# Patient Record
Sex: FEMALE | Race: WHITE | NOT HISPANIC OR LATINO | Employment: FULL TIME
[De-identification: names, ages, dates, MRNs, and addresses within clinical notes are randomized per-mention and may not be internally consistent; named-entity substitution may affect disease eponyms.]

---

## 2017-07-08 ENCOUNTER — HEALTH MAINTENANCE LETTER (OUTPATIENT)
Age: 32
End: 2017-07-08

## 2019-10-04 ENCOUNTER — HEALTH MAINTENANCE LETTER (OUTPATIENT)
Age: 34
End: 2019-10-04

## 2020-02-04 ENCOUNTER — OFFICE VISIT (OUTPATIENT)
Dept: OPHTHALMOLOGY | Facility: CLINIC | Age: 35
End: 2020-02-04
Payer: COMMERCIAL

## 2020-02-04 DIAGNOSIS — H52.03 HYPERMETROPIA OF BOTH EYES: ICD-10-CM

## 2020-02-04 DIAGNOSIS — H40.10X4 APHAKIC OPEN-ANGLE GLAUCOMA OF BOTH EYES, INDETERMINATE STAGE: Primary | ICD-10-CM

## 2020-02-04 DIAGNOSIS — H27.03 APHAKIC OPEN-ANGLE GLAUCOMA OF BOTH EYES, INDETERMINATE STAGE: Primary | ICD-10-CM

## 2020-02-04 RX ORDER — DORZOLAMIDE HYDROCHLORIDE AND TIMOLOL MALEATE 20; 5 MG/ML; MG/ML
1 SOLUTION/ DROPS OPHTHALMIC 2 TIMES DAILY
Qty: 1 BOTTLE | Refills: 11 | Status: SHIPPED | OUTPATIENT
Start: 2020-02-04 | End: 2022-06-02

## 2020-02-04 RX ORDER — LATANOPROST 50 UG/ML
1 SOLUTION/ DROPS OPHTHALMIC AT BEDTIME
Qty: 1 BOTTLE | Refills: 11 | Status: SHIPPED | OUTPATIENT
Start: 2020-02-04 | End: 2022-06-02

## 2020-02-04 ASSESSMENT — SLIT LAMP EXAM - LIDS
COMMENTS: NORMAL
COMMENTS: NORMAL

## 2020-02-04 ASSESSMENT — REFRACTION_MANIFEST
OS_SPHERE: +14.25
OD_CYLINDER: SPHERE
OD_ADD: +4.00
OD_SPHERE: +13.75
OS_CYLINDER: SPHERE
OS_ADD: +4.00

## 2020-02-04 ASSESSMENT — VISUAL ACUITY
OS_CC+: -1
OD_CC: 20/70
OS_CC: 20/70
CORRECTION_TYPE: GLASSES
METHOD: SNELLEN - LINEAR
OD_CC+: +1

## 2020-02-04 ASSESSMENT — CONF VISUAL FIELD
OD_NORMAL: 1
OS_NORMAL: 1

## 2020-02-04 ASSESSMENT — TONOMETRY
OS_IOP_MMHG: 13
IOP_METHOD: ICARE
OD_IOP_MMHG: 14

## 2020-02-04 ASSESSMENT — CUP TO DISC RATIO
OD_RATIO: CUPPED
OS_RATIO: 0.4

## 2020-02-04 ASSESSMENT — REFRACTION_WEARINGRX
OD_AXIS: 090
OS_SPHERE: +14.75
OS_ADD: +4.00
OD_SPHERE: +14.25
OD_ADD: +4.00
OS_AXIS: 090
OS_CYLINDER: +0.25
OD_CYLINDER: +0.25

## 2020-02-04 NOTE — NURSING NOTE
Chief Complaints and History of Present Illnesses   Patient presents with     Annual Eye Exam     Chief Complaint(s) and History of Present Illness(es)     Annual Eye Exam     Laterality: both eyes    Onset: gradual    Onset: years ago    Frequency: constantly    Course: stable    Treatments tried: eye drops    Pain scale: 0/10              Comments     Patient states vision has been stable since last eye exam, both eyes. Denies eye pain or irritation.     Using Dorz BID OU, Travatan BID OU    Nuvia Summers COT 11:38 AM February 4, 2020

## 2020-02-04 NOTE — PROGRESS NOTES
History  HPI     Annual Eye Exam     In both eyes.  Onset was gradual.  This started years ago.  Occurring constantly.  Since onset it is stable.  Treatments tried include eye drops.  Pain was noted as 0/10.              Comments     Patient states vision has been stable since last eye exam, both eyes. Denies eye pain or irritation.     Using Dorz BID OU, Travatan BID OU    Nuvia Summers COT 11:38 AM February 4, 2020     Has been going to local eye clinics for pressure checks and to refill eye medications in [place].            Last edited by Lion Alston, OD on 2/4/2020 12:11 PM. (History)          Assessment/Plan  (H40.10X4,  H27.03) Aphakic open-angle glaucoma of both eyes, indeterminate stage  (primary encounter diagnosis)  Comment: Good IOPs today, closed angle noted previously right eye   Plan: dorzolamide-timolol (COSOPT) 2-0.5 % ophthalmic solution, latanoprost (XALATAN) 0.005 % ophthalmic solution         Patient lost to follow up. Pressures good today. Scheduled with Dr. Finley to continue care. Continue Cosopt twice a day both eyes and Xalatan at bedtime both eyes.     (H52.03) Hypermetropia of both eyes  Comment: Simple hyperopia both eyes, aphakic both eyes  Plan: REFRACTION         Dispensed spectacle prescription for full time wear. Recommend lenticular lens design due to high plus. Educated patient on possibility of adaptation period, if symptoms do not improve return to clinic for further testing.     Emily Adams, Optometry Student    I have reviewed and agree with the above plan as written.    Teaching Statement:    Complete documentation of historical and exam elements from today's encounter can  be found in the full encounter summary report (not reduplicated in this progress  note). I personally obtained the chief complaint(s) and history of present illness. I  confirmed and edited as necessary the review of systems, past medical/surgical  history, family history, social history, and  examination findings as documented by  others; and I examined the patient myself. I personally reviewed the relevant tests,  images, and reports as documented above. I formulated and edited as necessary the  assessment and plan and discussed the findings and management plan with the  patient and family.    Lion Alston OD, FAAO

## 2020-02-20 ENCOUNTER — OFFICE VISIT (OUTPATIENT)
Dept: OPHTHALMOLOGY | Facility: CLINIC | Age: 35
End: 2020-02-20
Attending: OPHTHALMOLOGY
Payer: COMMERCIAL

## 2020-02-20 DIAGNOSIS — H27.03 APHAKIC OPEN-ANGLE GLAUCOMA OF BOTH EYES, INDETERMINATE STAGE: Primary | ICD-10-CM

## 2020-02-20 DIAGNOSIS — H27.03 APHAKIA OF BOTH EYES: ICD-10-CM

## 2020-02-20 DIAGNOSIS — H27.03 APHAKIC OPEN-ANGLE GLAUCOMA OF BOTH EYES, MILD STAGE: ICD-10-CM

## 2020-02-20 DIAGNOSIS — H40.10X4 APHAKIC OPEN-ANGLE GLAUCOMA OF BOTH EYES, INDETERMINATE STAGE: Primary | ICD-10-CM

## 2020-02-20 DIAGNOSIS — H40.10X1 APHAKIC OPEN-ANGLE GLAUCOMA OF BOTH EYES, MILD STAGE: ICD-10-CM

## 2020-02-20 PROCEDURE — 76514 ECHO EXAM OF EYE THICKNESS: CPT | Mod: ZF | Performed by: OPHTHALMOLOGY

## 2020-02-20 PROCEDURE — G0463 HOSPITAL OUTPT CLINIC VISIT: HCPCS | Mod: ZF

## 2020-02-20 PROCEDURE — 92083 EXTENDED VISUAL FIELD XM: CPT | Mod: ZF | Performed by: OPHTHALMOLOGY

## 2020-02-20 RX ORDER — BRIMONIDINE TARTRATE AND TIMOLOL MALEATE 2; 5 MG/ML; MG/ML
1 SOLUTION OPHTHALMIC 2 TIMES DAILY
Qty: 10 ML | Refills: 11 | Status: SHIPPED | OUTPATIENT
Start: 2020-02-20 | End: 2022-10-20

## 2020-02-20 ASSESSMENT — TONOMETRY
OD_IOP_MMHG: 11
IOP_METHOD: APPLANATION
OS_IOP_MMHG: 15
OS_IOP_MMHG: 11
IOP_METHOD: APPLANATION
OD_IOP_MMHG: 09

## 2020-02-20 ASSESSMENT — REFRACTION_WEARINGRX
OS_SPHERE: +14.75
OD_AXIS: 090
OS_ADD: +4.00
OD_CYLINDER: +0.25
OS_AXIS: 090
OS_CYLINDER: +0.25
OD_SPHERE: +14.25
OD_ADD: +4.00

## 2020-02-20 ASSESSMENT — VISUAL ACUITY
OD_CC+: +1
OD_CC: 20/80
CORRECTION_TYPE: GLASSES
OS_CC+: -2
METHOD: SNELLEN - LINEAR
OS_CC: 20/70

## 2020-02-20 ASSESSMENT — PACHYMETRY
OD_CT(UM): 706
OS_CT(UM): 779

## 2020-02-20 ASSESSMENT — CONF VISUAL FIELD
OD_SUPERIOR_TEMPORAL_RESTRICTION: 3
METHOD: COUNTING FINGERS
OS_NORMAL: 1

## 2020-02-20 ASSESSMENT — SLIT LAMP EXAM - LIDS
COMMENTS: NORMAL
COMMENTS: NORMAL

## 2020-02-20 ASSESSMENT — CUP TO DISC RATIO
OS_RATIO: 0.4
OD_RATIO: 0.9

## 2020-02-20 NOTE — PATIENT INSTRUCTIONS
Patient will discontinue and hold onto Cosopt (Timolol/Dorzolamide) which is a blue top drop and Travatan which is a teal top drop.  Patient will trial Combigan (Timolol/brimonidine) which is a blue top drop 2x/day (12 hours apart) in BOTH EYES and Lumigan which is a teal top drop at bedtime in BOTH EYES.  If patient is still having irritation and medications are too expensive patient will start on Timolol which is a yellow top drop in the morning in BOTH EYES and Latanoprost which is a teal top drop at bedtime in BOTH EYES.  Patient will return to clinic in 1-2 months with repeat IOP check.        The Latanoprost/Lumigan drop may cause lash growth and some darkening of the skin around the eye.  It is also possible in a patient with a freckled iris or joel eyes, that the iris could darken to brown with time, something that is not common but not reversible.

## 2020-02-20 NOTE — PROGRESS NOTES
1)Aphakic Glaucoma -- H/O noncompliance with follow up visits --  K pachy:706/779    Tmax: 35/25 off gtts on isolated note from     HVF:   Full in  on LVC    CDR: 0.9/0.4    HRT/OCT:       FHX of G sons, twin sister with cliff cataracts and aphakic glc   Gonio:       Intolerant to: Cosopt -- burning/stining (will tolerate for IOP control if necessary), Travatan -- too expensive     Asthma/COPD: No, tolerating topical BB  Steroid Use: No    Kidney Stones:  No   Sulfa Allergy:   No   IOP targets:  2)Aphakia OU -- H/O congential cataracts -- baseline VA OU:20/70 -- following with Dr. Alston  3)H/O Nystagmus  4)H/O RD ??OS (vs OD per old notes) s/p repair as a child    MD:pt reports marked burning/stinging with Cosopt -- ??2/2 TCAI -- will trial Combigan prior to trialing T1/2 to see if irritation improves     MD:pt using Travatan (saw picture on phone) -- which is very expensive for patient     Patient will discontinue and hold onto Cosopt (Timolol/Dorzolamide) which is a blue top drop and Travatan which is a teal top drop.  Patient will trial Combigan (Timolol/brimonidine) which is a blue top drop 2x/day (12 hours apart) in BOTH EYES and Lumigan which is a teal top drop at bedtime in BOTH EYES.  If patient is still having irritation and medications are too expensive patient will start on Timolol which is a yellow top drop in the morning in BOTH EYES and Latanoprost which is a teal top drop at bedtime in BOTH EYES.  Patient will return to clinic in 1-2 months with repeat IOP check.        The Latanoprost/Lumigan drop may cause lash growth and some darkening of the skin around the eye.  It is also possible in a patient with a freckled iris or joel eyes, that the iris could darken to brown with time, something that is not common but not reversible.        Resident note (edu purposes only):  Here for LVC and IOP check    Using Cosopt BID each eye and latan at bedtime each eye for many years. Average week  maybe misses 2-3 doses total. Reports compliance good b/c 2 sons also use drops for same.   FHx: Yes 2 sons, twin sister with congenital cataracts and aphakic glc  No steroids, renal stones, sulfa allergy    Field today right eye - paracentral/cecocentral depression with good reliability, left eye - superior focal defect, reliable; 2013 VF full each eye  OCT RNFL - thin right eye with poor quality tracing. left eye thick numerically superiorly, IT.  IOP L-M teens today  Pachy: 706/779  Gonio: difficult time w/ my exam today  CPM  RCK IOP, LVC 4-6 months    Justin Holamn MD  Department of Ophthalmology - PGY3         Attending Physician Attestation:  Complete documentation of historical and exam elements from today's encounter can be found in the full encounter summary report (not reduplicated in this progress note). I personally obtained the chief complaint(s) and history of present illness.  I confirmed and edited as necessary the review of systems, past medical/surgical history, family history, social history, and examination findings as documented by others; and I examined the patient myself. I personally reviewed the relevant tests, images, and reports as documented above. I formulated and edited as necessary the assessment and plan and discussed the findings and management plan with the patient and family.  - Cherry Finley MD

## 2020-03-30 ENCOUNTER — TELEPHONE (OUTPATIENT)
Dept: OPHTHALMOLOGY | Facility: CLINIC | Age: 35
End: 2020-03-30

## 2020-03-30 NOTE — TELEPHONE ENCOUNTER
Spoke with patient about cancelling appointment due to COVID 19. Let her know  that Adithya will be calling back to re-schedule.    Elisha Riggs COT 9:34 AM March 30, 2020

## 2020-11-14 ENCOUNTER — HEALTH MAINTENANCE LETTER (OUTPATIENT)
Age: 35
End: 2020-11-14

## 2021-09-12 ENCOUNTER — HEALTH MAINTENANCE LETTER (OUTPATIENT)
Age: 36
End: 2021-09-12

## 2022-01-02 ENCOUNTER — HEALTH MAINTENANCE LETTER (OUTPATIENT)
Age: 37
End: 2022-01-02

## 2022-02-09 DIAGNOSIS — H27.03 APHAKIC OPEN-ANGLE GLAUCOMA OF BOTH EYES, MILD STAGE: Primary | ICD-10-CM

## 2022-02-09 DIAGNOSIS — H40.10X1 APHAKIC OPEN-ANGLE GLAUCOMA OF BOTH EYES, MILD STAGE: Primary | ICD-10-CM

## 2022-05-26 DIAGNOSIS — H40.10X1 APHAKIC OPEN-ANGLE GLAUCOMA OF BOTH EYES, MILD STAGE: Primary | ICD-10-CM

## 2022-05-26 DIAGNOSIS — H27.03 APHAKIC OPEN-ANGLE GLAUCOMA OF BOTH EYES, MILD STAGE: Primary | ICD-10-CM

## 2022-06-02 ENCOUNTER — OFFICE VISIT (OUTPATIENT)
Dept: OPHTHALMOLOGY | Facility: CLINIC | Age: 37
End: 2022-06-02
Attending: OPHTHALMOLOGY
Payer: COMMERCIAL

## 2022-06-02 DIAGNOSIS — H40.10X1 APHAKIC OPEN-ANGLE GLAUCOMA OF BOTH EYES, MILD STAGE: ICD-10-CM

## 2022-06-02 DIAGNOSIS — H40.10X4 APHAKIC OPEN-ANGLE GLAUCOMA OF BOTH EYES, INDETERMINATE STAGE: ICD-10-CM

## 2022-06-02 DIAGNOSIS — H27.03 APHAKIC OPEN-ANGLE GLAUCOMA OF BOTH EYES, MILD STAGE: ICD-10-CM

## 2022-06-02 DIAGNOSIS — H27.03 APHAKIC OPEN-ANGLE GLAUCOMA OF BOTH EYES, INDETERMINATE STAGE: ICD-10-CM

## 2022-06-02 PROCEDURE — 92083 EXTENDED VISUAL FIELD XM: CPT | Performed by: OPHTHALMOLOGY

## 2022-06-02 PROCEDURE — G0463 HOSPITAL OUTPT CLINIC VISIT: HCPCS | Mod: 25

## 2022-06-02 PROCEDURE — 76514 ECHO EXAM OF EYE THICKNESS: CPT | Performed by: OPHTHALMOLOGY

## 2022-06-02 PROCEDURE — 99214 OFFICE O/P EST MOD 30 MIN: CPT | Mod: GC | Performed by: OPHTHALMOLOGY

## 2022-06-02 PROCEDURE — 92133 CPTRZD OPH DX IMG PST SGM ON: CPT | Performed by: OPHTHALMOLOGY

## 2022-06-02 RX ORDER — DORZOLAMIDE HYDROCHLORIDE AND TIMOLOL MALEATE 20; 5 MG/ML; MG/ML
1 SOLUTION/ DROPS OPHTHALMIC 2 TIMES DAILY
Qty: 10 ML | Refills: 0 | Status: SHIPPED | OUTPATIENT
Start: 2022-06-02 | End: 2022-06-16

## 2022-06-02 RX ORDER — LATANOPROST 50 UG/ML
1 SOLUTION/ DROPS OPHTHALMIC AT BEDTIME
Qty: 2.5 ML | Refills: 0 | Status: SHIPPED | OUTPATIENT
Start: 2022-06-02 | End: 2022-06-02

## 2022-06-02 RX ORDER — LATANOPROST 50 UG/ML
1 SOLUTION/ DROPS OPHTHALMIC AT BEDTIME
Qty: 2.5 ML | Refills: 0 | Status: SHIPPED | OUTPATIENT
Start: 2022-06-02 | End: 2022-06-16

## 2022-06-02 ASSESSMENT — PACHYMETRY
OS_CT(UM): 752
OD_CT(UM): 704

## 2022-06-02 ASSESSMENT — TONOMETRY
IOP_METHOD: APPLANATION HL
OD_IOP_MMHG: 36
OS_IOP_MMHG: 29
OS_IOP_MMHG: 34
OD_IOP_MMHG: 37
OD_IOP_MMHG: 38
IOP_METHOD: TONOPEN
IOP_METHOD: TONOPEN
OS_IOP_MMHG: 37

## 2022-06-02 ASSESSMENT — VISUAL ACUITY
OD_CC: 20/60
OS_CC: 20/60
METHOD: SNELLEN - LINEAR
OS_CC+: -1
CORRECTION_TYPE: GLASSES
OD_CC+: +2

## 2022-06-02 ASSESSMENT — CONF VISUAL FIELD
METHOD: COUNTING FINGERS
OS_INFERIOR_TEMPORAL_RESTRICTION: 3
OD_NORMAL: 1

## 2022-06-02 ASSESSMENT — CUP TO DISC RATIO
OS_RATIO: 0.5
OD_RATIO: 0.9

## 2022-06-02 ASSESSMENT — REFRACTION_WEARINGRX
OD_CYLINDER: SPHERE
SPECS_TYPE: BIFOCAL
OS_CYLINDER: SPHERE
OS_SPHERE: +14.25
OD_SPHERE: +13.75
OS_ADD: +4.00
OD_ADD: +4.00

## 2022-06-02 ASSESSMENT — SLIT LAMP EXAM - LIDS
COMMENTS: NORMAL
COMMENTS: NORMAL

## 2022-06-02 NOTE — PATIENT INSTRUCTIONS
Restart Cosopt (Timolol/Dorzolamide) which is a blue top- apply 1 drop twice a day in each eye     Restart Latanoprost (green top)- apply once drop at bedtime to each eye     The Latanoprost/Lumigan drop may cause lash growth and some darkening of the skin around the eye.  It is also possible in a patient with a freckled iris or joel eyes, that the iris could darken to brown with time, something that is not common but not reversible.

## 2022-06-02 NOTE — NURSING NOTE
Chief Complaints and History of Present Illnesses   Patient presents with     Glaucoma Evaluation     Aphakic Glaucoma Both Eyes     Chief Complaint(s) and History of Present Illness(es)     Glaucoma Evaluation     Comments: Aphakic Glaucoma Both Eyes              Comments     Patient states that her vision seems the same since she was here last. No pain  No flashes of light. No floaters.     Drops: Pt has been out of drops for 3 months.     Kayden SONG, June 2, 2022, 9:35 AM

## 2022-06-02 NOTE — PROGRESS NOTES
Chief Complaint/Presenting Concern: Glaucoma evaluation    History of Present Illness:   Romel Chua is a 37 year old patient who presents for evaluation of glaucoma.     Former patient of Dr. Finley last seen 2/20/2020 and was lost to follow up. Patient states that her vision seems the same since she was here last. No pain. No flashes of light. No floaters. Has been on Cosopt twice a day in both eyes and latanoprost at bedtime in both eyes. Pt has been out of drops for 3 months. Mild discomfort with Cosopt drops but doing okay.     Relevant Past Medical/Family/Social History:  None    Relevant Review of Systems: none     Diagnosis: Chronic angle closure glaucoma    Aphakic Glaucoma   history of noncompliance with follow up visits  Year diagnosis:  Previous glaucoma surgery/laser: none  Maximum intraocular pressure 35/25   Currently Meds: none  Family history: positive, 2 sons and twin sister  CCT: 706/779      Gonio:  Refractive status: unclear  Trauma history: negative  Steroid exposure: negative  Vasospastic disease: Migraine/Raynaud phenomenon: +migraines only  A past hemodynamic crisis or Low BP: negative  Meds AEs/intolerance:  Cosopt -- burning/stining (will tolerate for IOP control if necessary), Travatan -- too expensive, tolerates topical BB.  PMHx: Asthma and respiratory problems/Cardiac/Renal/Kidney stones/Sulfa Allergy: none  Anticoagulants: none    Today's testing:  - IOP: 37/37  - Visual field June 1, 2022  - Right eye - new nasal step and superior arcuate, fairly reliable  - Left eye - generalized depression, reliable  - OCT Optic Nerve RNFL Spectralis June 1, 2022  - Right Eye: diffusely thinned, poor reliability  - Left Eye: thick, poor reliability    Additional Ocular History:     2) Aphakia each eye -- H/O congential cataracts -- baseline VA OU:20/70 -- following with Dr. Alston  3) History of nystagmus  4) History of RD ??OS (vs OD per old notes) s/p repair as a child  5) History of  strabismus repair       Plan/Recommendations:    Discussed findings with patient.     IOP is currently 37/37 mmHg without drops. VFs today concerning for progression in both eyes R>L, related likely to lose to follow up.      Given findings today, we recommend patient should restart her glaucoma drop therapy, long discussion on risk of losing vision with glaucoma and importance of compliance with follow up visits and drops.     Restart Cosopt twice a day in both eyes     Restart latanoprost at bedtime in both eyes     RTC Return in about 2 weeks (around 6/16/2022) for Follow Up, v/t.    Herlinda Bauer MD  PGY-3 Resident Physician  Department of Ophthalmology      Physician Attestation     Attending Physician Attestation:  Complete documentation of historical and exam elements from today's encounter can be found in the full encounter summary report (not reduplicated in this progress note). I personally obtained the chief complaint(s) and history of present illness. I confirmed and edited as necessary the review of systems, past medical/surgical history, family history, social history, and examination findings as documented by others; and I examined the patient myself. I personally reviewed the relevant tests, images, and reports as documented above. I personally reviewed the ophthalmic test(s) associated with this encounter, agree with the interpretation(s) as documented by the resident/fellow and have edited the corresponding report(s) as necessary. I formulated and edited as necessary the assessment and plan and discussed the findings and management plan with the patient and any family members present at the time of the visit.  Elizabeth Vo M.D., Glaucoma, June 2, 2022

## 2022-06-16 ENCOUNTER — OFFICE VISIT (OUTPATIENT)
Dept: OPHTHALMOLOGY | Facility: CLINIC | Age: 37
End: 2022-06-16
Attending: OPHTHALMOLOGY
Payer: COMMERCIAL

## 2022-06-16 DIAGNOSIS — H27.03 APHAKIC OPEN-ANGLE GLAUCOMA OF BOTH EYES, INDETERMINATE STAGE: Primary | ICD-10-CM

## 2022-06-16 DIAGNOSIS — H40.10X4 APHAKIC OPEN-ANGLE GLAUCOMA OF BOTH EYES, INDETERMINATE STAGE: Primary | ICD-10-CM

## 2022-06-16 PROCEDURE — 99213 OFFICE O/P EST LOW 20 MIN: CPT | Mod: GC | Performed by: OPHTHALMOLOGY

## 2022-06-16 PROCEDURE — G0463 HOSPITAL OUTPT CLINIC VISIT: HCPCS

## 2022-06-16 RX ORDER — DORZOLAMIDE HYDROCHLORIDE AND TIMOLOL MALEATE 20; 5 MG/ML; MG/ML
1 SOLUTION/ DROPS OPHTHALMIC 2 TIMES DAILY
Qty: 10 ML | Refills: 1 | Status: SHIPPED | OUTPATIENT
Start: 2022-06-16 | End: 2022-09-02

## 2022-06-16 RX ORDER — LATANOPROST 50 UG/ML
1 SOLUTION/ DROPS OPHTHALMIC AT BEDTIME
Qty: 7.5 ML | Refills: 1 | Status: SHIPPED | OUTPATIENT
Start: 2022-06-16 | End: 2022-10-20

## 2022-06-16 ASSESSMENT — VISUAL ACUITY
OD_CC: 20/60
CORRECTION_TYPE: GLASSES
OS_CC: 20/70
METHOD: SNELLEN - LINEAR

## 2022-06-16 ASSESSMENT — CONF VISUAL FIELD
OS_NORMAL: 1
OD_NORMAL: 1

## 2022-06-16 ASSESSMENT — TONOMETRY
OD_IOP_MMHG: 15
IOP_METHOD: APPLANATION
OS_IOP_MMHG: 17
IOP_METHOD: APPLANATION HL
OS_IOP_MMHG: 14
OD_IOP_MMHG: 14

## 2022-06-16 ASSESSMENT — SLIT LAMP EXAM - LIDS
COMMENTS: NORMAL
COMMENTS: NORMAL

## 2022-06-16 NOTE — NURSING NOTE
Chief Complaints and History of Present Illnesses   Patient presents with     Glaucoma Follow-Up     Chief Complaint(s) and History of Present Illness(es)     Glaucoma Follow-Up     Laterality: both eyes              Comments     Pt. States that she is doing well. No change in VA BE. No pain BE. No flashes or floaters BE.   Elisha Riggs COT 9:39 AM June 16, 2022

## 2022-06-16 NOTE — PROGRESS NOTES
Chief Complaint/Presenting Concern: Glaucoma evaluation    History of Present Illness:   Romel Chua is a 37 year old patient who presents for evaluation of glaucoma.     Former patient of Dr. Finley last seen 2/20/2020 and was lost to follow up. Patient states that her vision seems the same since she was here last. No pain. No flashes of light. No floaters. Has been on Cosopt twice a day in both eyes and latanoprost at bedtime in both eyes. Pt had been out of drops for 3 months.     Restarted drops at last visit 6/3/22. Prior VFs today concerning for progression in both eyes R>L, related likely to lose to follow up.  Doing well with them. Compliant.   States that she is doing well. No change in VA BE. No pain BE. No flashes or floaters BE.     Relevant Past Medical/Family/Social History:  None    Relevant Review of Systems: none     Diagnosis: Chronic angle closure glaucoma    Aphakic Glaucoma   history of noncompliance with follow up visits  Year diagnosis:  Previous glaucoma surgery/laser: none  Maximum intraocular pressure 35/25   Currently Meds: none  Family history: positive, 2 sons and twin sister  CCT: 706/779      Gonio:  Refractive status: unclear  Trauma history: negative  Steroid exposure: negative  Vasospastic disease: Migraine/Raynaud phenomenon: +migraines only  A past hemodynamic crisis or Low BP: negative  Meds AEs/intolerance:  Cosopt -- burning/stining (will tolerate for IOP control if necessary), Travatan -- too expensive, tolerates topical BB.  PMHx: Asthma and respiratory problems/Cardiac/Renal/Kidney stones/Sulfa Allergy: none  Anticoagulants: none  Prior testing:  - Visual field June 1, 2022  - Right eye - new nasal step and superior arcuate, fairly reliable  - Left eye - generalized depression, reliable  - OCT Optic Nerve RNFL Spectralis June 1, 2022  - Right Eye: diffusely thinned, poor reliability  - Left Eye: thick, poor reliability    Today's testing:  - IOP: 14/14  mmHg    Additional Ocular History:     2) Aphakia each eye -- H/O congential cataracts -- baseline VA OU:20/70 -- following with Dr. Alston  3) History of nystagmus  4) History of RD ??OS (vs OD per old notes) s/p repair as a child  5) History of strabismus repair       Plan/Recommendations:    Discussed findings with patient.     IOP is currently 14/14 mmHg on current regimen. Much improved since starting drops. Emphasized importance of compliance with drops.     Continue Cosopt twice a day in both eyes     Continue latanoprost at bedtime in both eyes     RTC Return in about 4 months (around 10/16/2022) for Follow Up, v/t, VF.    Herlinda Bauer MD  PGY-3 Resident Physician  Department of Ophthalmology      Physician Attestation     Attending Physician Attestation:  Complete documentation of historical and exam elements from today's encounter can be found in the full encounter summary report (not reduplicated in this progress note). I personally obtained the chief complaint(s) and history of present illness. I confirmed and edited as necessary the review of systems, past medical/surgical history, family history, social history, and examination findings as documented by others; and I examined the patient myself. I personally reviewed the relevant tests, images, and reports as documented above. I formulated and edited as necessary the assessment and plan and discussed the findings and management plan with the patient and any family members present at the time of the visit.  Elizabeth Vo M.D., Glaucoma, June 16, 2022

## 2022-09-01 DIAGNOSIS — H27.03 APHAKIC OPEN-ANGLE GLAUCOMA OF BOTH EYES, INDETERMINATE STAGE: ICD-10-CM

## 2022-09-01 DIAGNOSIS — H40.10X4 APHAKIC OPEN-ANGLE GLAUCOMA OF BOTH EYES, INDETERMINATE STAGE: ICD-10-CM

## 2022-09-02 RX ORDER — DORZOLAMIDE HYDROCHLORIDE AND TIMOLOL MALEATE 20; 5 MG/ML; MG/ML
1 SOLUTION/ DROPS OPHTHALMIC 2 TIMES DAILY
Qty: 10 ML | Refills: 2 | Status: SHIPPED | OUTPATIENT
Start: 2022-09-02 | End: 2022-10-20

## 2022-09-02 NOTE — TELEPHONE ENCOUNTER
dorzolamide-timolol (COSOPT) 2-0.5 % ophthalmic solution  Last Written Prescription Date:   6/16/2022  Last Fill Quantity: 10,   # refills: 1  Last Office Visit :  6/16/2022  Future Office visit:   10/20/2022     Elizabeth Vo MD    Ophthalmology       Plan/Recommendations:    Discussed findings with patient.     IOP is currently 14/14 mmHg on current regimen. Much improved since starting drops. Emphasized importance of compliance with drops.     Continue Cosopt twice a day in both eyes     Continue latanoprost at bedtime in both eyes      RTC Return in about 4 months (around 10/16/2022) for Follow Up, v/t, VF.     Herlinda Bauer MD  PGY-3 Resident Physician  Department of Ophthalmology       10 mL, 2 Refills sent to pharm 9/2/2022      Mei Mullins RN  Central Triage Red Flags/Med Refills

## 2022-10-18 DIAGNOSIS — H40.10X4 APHAKIC OPEN-ANGLE GLAUCOMA OF BOTH EYES, INDETERMINATE STAGE: Primary | ICD-10-CM

## 2022-10-18 DIAGNOSIS — H27.03 APHAKIC OPEN-ANGLE GLAUCOMA OF BOTH EYES, INDETERMINATE STAGE: Primary | ICD-10-CM

## 2022-10-20 ENCOUNTER — OFFICE VISIT (OUTPATIENT)
Dept: OPHTHALMOLOGY | Facility: CLINIC | Age: 37
End: 2022-10-20
Attending: OPHTHALMOLOGY
Payer: COMMERCIAL

## 2022-10-20 DIAGNOSIS — H27.03 APHAKIC OPEN-ANGLE GLAUCOMA OF BOTH EYES, INDETERMINATE STAGE: ICD-10-CM

## 2022-10-20 DIAGNOSIS — H40.10X4 APHAKIC OPEN-ANGLE GLAUCOMA OF BOTH EYES, INDETERMINATE STAGE: ICD-10-CM

## 2022-10-20 PROCEDURE — 99213 OFFICE O/P EST LOW 20 MIN: CPT | Mod: GC | Performed by: OPHTHALMOLOGY

## 2022-10-20 PROCEDURE — 92083 EXTENDED VISUAL FIELD XM: CPT | Performed by: OPHTHALMOLOGY

## 2022-10-20 PROCEDURE — G0463 HOSPITAL OUTPT CLINIC VISIT: HCPCS | Mod: 25

## 2022-10-20 RX ORDER — LATANOPROST 50 UG/ML
1 SOLUTION/ DROPS OPHTHALMIC AT BEDTIME
Qty: 7.5 ML | Refills: 1 | Status: SHIPPED | OUTPATIENT
Start: 2022-10-20 | End: 2023-06-01

## 2022-10-20 RX ORDER — DORZOLAMIDE HYDROCHLORIDE AND TIMOLOL MALEATE 20; 5 MG/ML; MG/ML
1 SOLUTION/ DROPS OPHTHALMIC 2 TIMES DAILY
Qty: 10 ML | Refills: 2 | Status: SHIPPED | OUTPATIENT
Start: 2022-10-20 | End: 2023-06-01

## 2022-10-20 ASSESSMENT — VISUAL ACUITY
OS_CC+: -2
OD_CC: 20/60
OD_CC+: -1
METHOD: SNELLEN - LINEAR
OS_CC: 20/60
CORRECTION_TYPE: GLASSES

## 2022-10-20 ASSESSMENT — TONOMETRY
OD_IOP_MMHG: 10
OD_IOP_MMHG: 12
IOP_METHOD: APPLANATION
IOP_METHOD: TONOPEN
OS_IOP_MMHG: 12
OS_IOP_MMHG: 14

## 2022-10-20 ASSESSMENT — REFRACTION_WEARINGRX
OS_ADD: +4.00
SPECS_TYPE: BIFOCAL
OD_ADD: +4.00
OS_SPHERE: +14.25
OS_CYLINDER: SPHERE
OD_CYLINDER: SPHERE
OD_SPHERE: +13.75

## 2022-10-20 ASSESSMENT — CONF VISUAL FIELD
OD_INFERIOR_NASAL_RESTRICTION: 0
OD_SUPERIOR_TEMPORAL_RESTRICTION: 0
OD_INFERIOR_TEMPORAL_RESTRICTION: 0
OS_SUPERIOR_NASAL_RESTRICTION: 0
OS_NORMAL: 1
OD_NORMAL: 1
OS_INFERIOR_TEMPORAL_RESTRICTION: 0
OS_INFERIOR_NASAL_RESTRICTION: 0
OD_SUPERIOR_NASAL_RESTRICTION: 0
METHOD: COUNTING FINGERS
OS_SUPERIOR_TEMPORAL_RESTRICTION: 0

## 2022-10-20 ASSESSMENT — SLIT LAMP EXAM - LIDS
COMMENTS: NORMAL
COMMENTS: NORMAL

## 2022-10-20 NOTE — PROGRESS NOTES
Chief Complaint/Presenting Concern: Glaucoma follow up    History of Present Illness:   Romel Chua is a 37 year old patient who presents for evaluation of glaucoma.     Former patient of Dr. Finley last seen 2/20/2020 and was lost to follow up. Patient states that her vision seems the same since she was here last. No pain. No flashes of light. No floaters. Has been on Cosopt twice a day in both eyes and latanoprost at bedtime in both eyes. Pt had been out of drops for 3 months.    Restarted drops at last visit 6/3/22. Prior VFs today concerning for progression in both eyes R>L, related likely to lose to follow up.  Doing well with them. Compliant.     10/20/22 States that she is doing well. No change in VA BE. No pain BE. No flashes or floaters BE.     Relevant Past Medical/Family/Social History:  None    Relevant Review of Systems: none     Diagnosis: Chronic angle closure glaucoma  each eye   Aniridia each eye   history of noncompliance with follow up visits  Year diagnosis:  Previous glaucoma surgery/laser: none  Maximum intraocular pressure 35/25   Currently Meds: none  Family history: positive, 2 sons and twin sister  CCT: 706/779      Gonio:  Refractive status: unclear  Trauma history: negative  Steroid exposure: negative  Vasospastic disease: Migraine/Raynaud phenomenon: +migraines only  A past hemodynamic crisis or Low BP: negative  Meds AEs/intolerance:  Cosopt -- burning/stining (will tolerate for IOP control if necessary), Travatan -- too expensive, tolerates topical BB.  PMHx: Asthma and respiratory problems/Cardiac/Renal/Kidney stones/Sulfa Allergy: none  Anticoagulants: none  Prior testing:  - OCT Optic Nerve RNFL Spectralis June 1, 2022  - Right Eye: diffusely thinned, poor reliability  - Left Eye: thick, poor reliability    Today's testing:  - IOP: 10/14 mmHg  - Visual field 10/20/22 24-2  - Right eye - variability from last visit with borderline FP 14% today shows central nonspecific pattern  defects and generalized total deviation depression, reliable, variable compared to last  - Left eye - generalized total deviation depression with inferior paracentral pattern defect, reliable, slightly worse compared to 6/2022    Additional Ocular History:     2) Aphakia each eye -- H/O congential cataracts -- baseline VA OU:20/70 -- following with Dr. Alston  3) History of nystagmus, foveal hypoplasia each eye   4) History of RD ??OS (vs OD per old notes) s/p repair as a child  5) History of strabismus repair    Plan/Recommendations:    Discussed findings with patient.     IOP is currently good current regimen in mid/low teens. Much improved since starting drops. VF right eye with some variability and possible left eye progression likely related toprevious elevated pressures 6/2022 when off drops.    Continue Cosopt twice a day in both eyes     Continue latanoprost at bedtime in both eyes     RTC 5-6 months VA, IOP, VF    Carl Duggan MD  Resident Physician - PGY3  Department of Ophthalmology   [facility]      Physician Attestation     Attending Physician Attestation:  Complete documentation of historical and exam elements from today's encounter can be found in the full encounter summary report (not reduplicated in this progress note). I personally obtained the chief complaint(s) and history of present illness. I confirmed and edited as necessary the review of systems, past medical/surgical history, family history, social history, and examination findings as documented by others; and I examined the patient myself. I personally reviewed the relevant tests, images, and reports as documented above. I personally reviewed the ophthalmic test(s) associated with this encounter, agree with the interpretation(s) as documented by the resident/fellow and have edited the corresponding report(s) as necessary. I formulated and edited as necessary the assessment and plan and discussed the findings and management  plan with the patient and any family members present at the time of the visit.  Elizabeth Vo M.D., Glaucoma, October 20, 2022

## 2022-10-20 NOTE — NURSING NOTE
Chief Complaints and History of Present Illnesses   Patient presents with     Follow Up     Chief Complaint(s) and History of Present Illness(es)     Follow Up           Comments    Patient states that her vision is the same since she was here last. No pain and irritation. No flashes of light. No floaters.     Ocular Meds:Cosopt twice a day in both eyes   Cosopt twice a day in both eyes     Kayden Resendez COT, October 20, 2022 10:06 AM

## 2022-11-19 ENCOUNTER — HEALTH MAINTENANCE LETTER (OUTPATIENT)
Age: 37
End: 2022-11-19

## 2023-04-09 ENCOUNTER — HEALTH MAINTENANCE LETTER (OUTPATIENT)
Age: 38
End: 2023-04-09

## 2023-05-31 DIAGNOSIS — H40.10X1 APHAKIC OPEN-ANGLE GLAUCOMA OF BOTH EYES, MILD STAGE: Primary | ICD-10-CM

## 2023-05-31 DIAGNOSIS — H27.03 APHAKIC OPEN-ANGLE GLAUCOMA OF BOTH EYES, MILD STAGE: Primary | ICD-10-CM

## 2023-06-01 ENCOUNTER — OFFICE VISIT (OUTPATIENT)
Dept: OPHTHALMOLOGY | Facility: CLINIC | Age: 38
End: 2023-06-01
Attending: OPHTHALMOLOGY
Payer: COMMERCIAL

## 2023-06-01 DIAGNOSIS — H27.03 APHAKIC OPEN-ANGLE GLAUCOMA OF BOTH EYES, MILD STAGE: ICD-10-CM

## 2023-06-01 DIAGNOSIS — H27.03 APHAKIC OPEN-ANGLE GLAUCOMA OF BOTH EYES, INDETERMINATE STAGE: ICD-10-CM

## 2023-06-01 DIAGNOSIS — H40.10X4 APHAKIC OPEN-ANGLE GLAUCOMA OF BOTH EYES, INDETERMINATE STAGE: ICD-10-CM

## 2023-06-01 DIAGNOSIS — H40.10X1 APHAKIC OPEN-ANGLE GLAUCOMA OF BOTH EYES, MILD STAGE: ICD-10-CM

## 2023-06-01 PROCEDURE — G0463 HOSPITAL OUTPT CLINIC VISIT: HCPCS | Performed by: OPHTHALMOLOGY

## 2023-06-01 PROCEDURE — 92083 EXTENDED VISUAL FIELD XM: CPT | Performed by: OPHTHALMOLOGY

## 2023-06-01 PROCEDURE — 92012 INTRM OPH EXAM EST PATIENT: CPT | Performed by: OPHTHALMOLOGY

## 2023-06-01 RX ORDER — LATANOPROST 50 UG/ML
1 SOLUTION/ DROPS OPHTHALMIC AT BEDTIME
Qty: 7.5 ML | Refills: 1 | Status: SHIPPED | OUTPATIENT
Start: 2023-06-01

## 2023-06-01 RX ORDER — DORZOLAMIDE HYDROCHLORIDE AND TIMOLOL MALEATE 20; 5 MG/ML; MG/ML
1 SOLUTION/ DROPS OPHTHALMIC 2 TIMES DAILY
Qty: 10 ML | Refills: 2 | Status: SHIPPED | OUTPATIENT
Start: 2023-06-01

## 2023-06-01 RX ORDER — DORZOLAMIDE HYDROCHLORIDE AND TIMOLOL MALEATE 20; 5 MG/ML; MG/ML
1 SOLUTION/ DROPS OPHTHALMIC 2 TIMES DAILY
Qty: 10 ML | Refills: 2 | Status: SHIPPED | OUTPATIENT
Start: 2023-06-01 | End: 2023-06-01

## 2023-06-01 RX ORDER — LATANOPROST 50 UG/ML
1 SOLUTION/ DROPS OPHTHALMIC AT BEDTIME
Qty: 7.5 ML | Refills: 1 | Status: SHIPPED | OUTPATIENT
Start: 2023-06-01 | End: 2023-06-01

## 2023-06-01 ASSESSMENT — REFRACTION_WEARINGRX
SPECS_TYPE: BIFOCAL
OS_ADD: +4.00
OD_ADD: +4.00
OS_SPHERE: +14.25
OD_CYLINDER: SPHERE
OS_CYLINDER: SPHERE
OD_SPHERE: +13.75

## 2023-06-01 ASSESSMENT — VISUAL ACUITY
OS_CC: 20/70
OD_CC: 20/70
CORRECTION_TYPE: GLASSES
METHOD: SNELLEN - LINEAR

## 2023-06-01 ASSESSMENT — CONF VISUAL FIELD
OD_INFERIOR_NASAL_RESTRICTION: 0
OS_NORMAL: 1
OS_SUPERIOR_TEMPORAL_RESTRICTION: 0
METHOD: COUNTING FINGERS
OS_INFERIOR_NASAL_RESTRICTION: 0
OS_SUPERIOR_NASAL_RESTRICTION: 0
OS_INFERIOR_TEMPORAL_RESTRICTION: 0
OD_INFERIOR_TEMPORAL_RESTRICTION: 0
OD_NORMAL: 1
OD_SUPERIOR_NASAL_RESTRICTION: 0
OD_SUPERIOR_TEMPORAL_RESTRICTION: 0

## 2023-06-01 ASSESSMENT — TONOMETRY
OS_IOP_MMHG: 16
IOP_METHOD: TONOPEN
OD_IOP_MMHG: 18

## 2023-06-01 ASSESSMENT — SLIT LAMP EXAM - LIDS
COMMENTS: NORMAL
COMMENTS: NORMAL

## 2023-06-01 NOTE — NURSING NOTE
Chief Complaints and History of Present Illnesses   Patient presents with     Glaucoma Follow-Up     Chief Complaint(s) and History of Present Illness(es)     Glaucoma Follow-Up            Laterality: both eyes (States va is the same since last visit  )    Associated symptoms: Negative for dryness, redness, tearing and photophobia    Treatment side effects: none    Compliance with Treatment: always    Pain scale: 0/10          Comments    Here for Aphakic open-angle glaucoma of both eyes, indeterminate stage  Cosopt twice a day in both eyes - burns   Latanoprost at bedtime in both eyes   eR Duggan COT 11:32 AM June 1, 2023

## 2023-06-01 NOTE — PATIENT INSTRUCTIONS
Cosopt (Timolol/Dorzolamide) which is a blue top- apply 1 drop twice a day in each eye     Latanoprost (green top)- apply once drop at bedtime to each eye     The Latanoprost/Lumigan drop may cause lash growth and some darkening of the skin around the eye.  It is also possible in a patient with a freckled iris or joel eyes, that the iris could darken to brown with time, something that is not common but not reversible.

## 2023-06-01 NOTE — PROGRESS NOTES
Chief Complaint/Presenting Concern: Glaucoma follow up    History of Present Illness:   Romel Chua is a 37 year old patient who presents for evaluation of glaucoma.     Former patient of Dr. Finley last seen 2/20/2020 and was lost to follow up. Patient states that her vision seems the same since she was here last. No pain. No flashes of light. No floaters. Has been on Cosopt twice a day in both eyes and latanoprost at bedtime in both eyes. Pt had been out of drops for 3 months.    Restarted drops at last visit 6/3/22. Prior VFs today concerning for progression in both eyes R>L, related likely to lose to follow up.  Doing well with them. Compliant.     States that she is doing well. No change in VA BE. No pain BE. No flashes or floaters BE.     Relevant Past Medical/Family/Social History:  None    Relevant Review of Systems: none     Diagnosis: Chronic angle closure glaucoma  each eye   Aniridia each eye   history of noncompliance with follow up visits  Year diagnosis:  Previous glaucoma surgery/laser: none  Maximum intraocular pressure 35/25   Currently Meds: none  Family history: positive, 2 sons and twin sister  CCT: 706/779      Gonio:  Refractive status: unclear  Trauma history: negative  Steroid exposure: negative  Vasospastic disease: Migraine/Raynaud phenomenon: +migraines only  A past hemodynamic crisis or Low BP: negative  Meds AEs/intolerance:  Cosopt -- burning/stining (will tolerate for IOP control if necessary), Travatan -- too expensive, tolerates topical BB.  PMHx: Asthma and respiratory problems/Cardiac/Renal/Kidney stones/Sulfa Allergy: none  Anticoagulants: none  Prior testing:  - OCT Optic Nerve RNFL Spectralis June 1, 2022  - Right Eye: diffusely thinned, poor reliability  - Left Eye: thick, poor reliability    Today's testing:  - Visual field 06/01/23  - Right eye - shows central nonspecific pattern defects and generalized total deviation depression, reliable, variable compared to last  -  Left eye - generalized total deviation depression with inferior paracentral pattern defect, reliable, variable     Additional Ocular History:     2) Aphakia each eye -- H/O congential cataracts -- baseline VA OU:20/70 -- following with Dr. Alston  3) History of nystagmus, foveal hypoplasia each eye   4) History of RD ??OS (vs OD per old notes) s/p repair as a child  5) History of strabismus repair    Plan/Recommendations:    Discussed findings with patient.     IOP is currently good current regimen in mid/low teens. Much improved since starting drops. VF right eye with some variability    Continue Cosopt twice a day in both eyes     Continue latanoprost at bedtime in both eyes     RTC in 1 year   Check IOP in 6 months closer to home in [place] with Dr. Esqueda       Physician Attestation     Attending Physician Attestation:  Complete documentation of historical and exam elements from today's encounter can be found in the full encounter summary report (not reduplicated in this progress note). I personally obtained the chief complaint(s) and history of present illness. I confirmed and edited as necessary the review of systems, past medical/surgical history, family history, social history, and examination findings as documented by others; and I examined the patient myself. I personally reviewed the relevant tests, images, and reports as documented above. I personally reviewed the ophthalmic test(s) associated with this encounter. I formulated and edited as necessary the assessment and plan and discussed the findings and management plan with the patient and any family members present at the time of the visit.  Elizabeth Vo M.D., Glaucoma, June 1, 2023

## 2024-04-06 ENCOUNTER — HEALTH MAINTENANCE LETTER (OUTPATIENT)
Age: 39
End: 2024-04-06

## 2024-06-15 ENCOUNTER — HEALTH MAINTENANCE LETTER (OUTPATIENT)
Age: 39
End: 2024-06-15

## 2025-07-06 ENCOUNTER — HEALTH MAINTENANCE LETTER (OUTPATIENT)
Age: 40
End: 2025-07-06